# Patient Record
Sex: FEMALE | Race: ASIAN | NOT HISPANIC OR LATINO | Employment: FULL TIME | URBAN - METROPOLITAN AREA
[De-identification: names, ages, dates, MRNs, and addresses within clinical notes are randomized per-mention and may not be internally consistent; named-entity substitution may affect disease eponyms.]

---

## 2022-08-26 ENCOUNTER — TRANSFERRED RECORDS (OUTPATIENT)
Dept: HEALTH INFORMATION MANAGEMENT | Facility: CLINIC | Age: 32
End: 2022-08-26

## 2022-08-26 ENCOUNTER — MEDICAL CORRESPONDENCE (OUTPATIENT)
Dept: HEALTH INFORMATION MANAGEMENT | Facility: CLINIC | Age: 32
End: 2022-08-26

## 2022-08-30 ENCOUNTER — ANCILLARY PROCEDURE (OUTPATIENT)
Dept: CARDIOLOGY | Facility: CLINIC | Age: 32
End: 2022-08-30
Attending: FAMILY MEDICINE
Payer: COMMERCIAL

## 2022-08-30 DIAGNOSIS — R07.9 CHEST PAIN: ICD-10-CM

## 2022-08-30 PROCEDURE — 93227 XTRNL ECG REC<48 HR R&I: CPT | Performed by: INTERNAL MEDICINE

## 2022-08-30 PROCEDURE — 93225 XTRNL ECG REC<48 HRS REC: CPT

## 2022-08-30 NOTE — PROGRESS NOTES
Per Mali Alas patient to have 48 hour holter monitor placed.  Diagnosis: Chest pain R07.9  Monitor placed: Yes  Patient Instructed: Yes  Patient verbalized understanding: Yes  Holter # 3  Placed by Dajuan Herrera

## 2022-09-09 ENCOUNTER — ANCILLARY PROCEDURE (OUTPATIENT)
Dept: CARDIOLOGY | Facility: CLINIC | Age: 32
End: 2022-09-09
Attending: FAMILY MEDICINE
Payer: COMMERCIAL

## 2022-09-09 DIAGNOSIS — R07.9 CHEST PAIN: ICD-10-CM

## 2022-09-09 LAB — LVEF ECHO: NORMAL

## 2022-09-09 PROCEDURE — 93306 TTE W/DOPPLER COMPLETE: CPT | Performed by: INTERNAL MEDICINE

## 2022-09-16 ENCOUNTER — TRANSFERRED RECORDS (OUTPATIENT)
Dept: HEALTH INFORMATION MANAGEMENT | Facility: CLINIC | Age: 32
End: 2022-09-16

## 2022-09-20 ENCOUNTER — TRANSFERRED RECORDS (OUTPATIENT)
Dept: HEALTH INFORMATION MANAGEMENT | Facility: CLINIC | Age: 32
End: 2022-09-20

## 2022-10-03 ENCOUNTER — HEALTH MAINTENANCE LETTER (OUTPATIENT)
Age: 32
End: 2022-10-03

## 2023-10-22 ENCOUNTER — HEALTH MAINTENANCE LETTER (OUTPATIENT)
Age: 33
End: 2023-10-22

## 2024-12-15 ENCOUNTER — HEALTH MAINTENANCE LETTER (OUTPATIENT)
Age: 34
End: 2024-12-15

## 2025-01-27 ENCOUNTER — PRE VISIT (OUTPATIENT)
Dept: PSYCHIATRY | Facility: CLINIC | Age: 35
End: 2025-01-27
Payer: COMMERCIAL

## 2025-01-27 NOTE — TELEPHONE ENCOUNTER
PSYCHIATRY CLINIC PHONE INTAKE     SERVICES REQUESTED / INTERESTED IN          Other:  ADHD Clinic    Presenting Problem and Brief History                              What would you like to be seen for? (brief description):  Pt was referred byt  at [facility]. Pt was diagnosed in April of 2023. Currently taking vyvanse 15mg, and adderrall slow release 30mg capsules. She would like to discuss how to treat her diagnosis with depression and anxiety. Its gotten worse over the winter. She also has GI issues, and states its from the adderall. She believes she needs more guidance with taking Vyvanse. Also has sleep issues. Pt works at the [employer] and currently has a psychiatrist but would like to be seen in 's clinic due to concerns with managing the ADHD while experiencing symptoms of depression and anxiety.     Have you received a mental health diagnosis? Yes   Which one (s): ADHD with potential depression and anxiety and complex PTSD  Is there any history of developmental delay?   Not sure    Are you currently seeing a mental health provider?  Yes            Who / month last seen:  Therapist - Griselda at [facility] for trauma therapy. Psych nurse is Mindi at [facility]  Do you have mental health records elsewhere?  Yes  Will you sign a release so we can obtain them?  Yes    Have you ever been hospitalized for psychiatric reasons?  No Describe:  NA    Do you have current thoughts of self-harm?   When she's under distress she has thoughts, but no plan or action. Declined to speak with a       Do you currently have thoughts of harming others?  No    Do you have any safety concerns? No   If yes to these, offer to reach out to a  for follow up.      Substance Use History     Do you have any history of alcohol  or other substance use?  No  Describe:  NA  Have you ever received treatment for this?  No    Describe:  NA     Social History     Who is the  patient's a guardian?  No    Name / number: NA  Have you had an ACT team in last 12 months?  No  Describe: NA   OK to leave a detailed voicemail?  Yes    Would you be interested in learning more about research opportunities for which you or your child may qualify? We can connect you with a team member for more information.   unknown   If yes, send an inbasket message to Yuni Diaz    Medical/ Surgical History                                 There is no problem list on file for this patient.         Medications             Have you taken >3 psychiatric medications in your past?  No  Do you currently take 5 or more medications, including prescriptions, supplements, and other over the counter products?  Fish oil, Vitamoin D3, and B-complex, Calcium magnesium, zinc.     If YES to at least one of these questions:   As part of your evaluation in our clinic, we have specially trained pharmacists as part of your care team. Your provider would like for you to meet with one of our pharmacists to review your current and past medications, ensure your med list is up to date, and queue up any questions or concerns you have about medications. They will review all of your medications, not just for mental health, to help ensure you know what you re taking and that everything is working together.     Please schedule patient in [facility] (Saniya Amaro or Michelle Payne) for 60m MTM in any green space as virtual (video), telephone, or in person (designated in person days per Epic templates).  -Appt notes can say  Psych eval on xx/xx   -Route telephone encounter to the pharmacist who will be seeing the patient.  If patient has questions about insurance coverage or billing, please still schedule the visit and refer them to call the MT coordinators at [phone].    No current outpatient medications on file.         DISPOSITION      1/27/25 Intake complete. Scheduled ADHD DA with  on 4/23/25 at 2:00pm.  Scheduled ADHD Med Eval with 4/30/25 at 1:15pm with .     Dorothy Bradford, [facility]

## 2025-01-30 ENCOUNTER — TRANSCRIBE ORDERS (OUTPATIENT)
Dept: OTHER | Age: 35
End: 2025-01-30

## 2025-01-30 DIAGNOSIS — F90.0 ATTENTION DEFICIT HYPERACTIVITY DISORDER, PREDOMINANTLY INATTENTIVE TYPE: Primary | ICD-10-CM

## 2025-01-30 DIAGNOSIS — F33.41 MAJOR DEPRESSIVE DISORDER, RECURRENT EPISODE, IN PARTIAL REMISSION: ICD-10-CM

## 2025-01-30 DIAGNOSIS — F43.9 TRAUMA AND STRESSOR-RELATED DISORDER: ICD-10-CM

## 2025-01-30 DIAGNOSIS — F41.9 ANXIETY DISORDER, UNSPECIFIED: ICD-10-CM
